# Patient Record
Sex: MALE | Race: BLACK OR AFRICAN AMERICAN | URBAN - METROPOLITAN AREA
[De-identification: names, ages, dates, MRNs, and addresses within clinical notes are randomized per-mention and may not be internally consistent; named-entity substitution may affect disease eponyms.]

---

## 2018-01-23 NOTE — LETTER
WORK STATUS WORKSHEET    Date & Time: 1/24/2025, 11:32 AM  Patient: Jing Valenzuela    Diagnosis:  No diagnosis found.    Next Appointment Date/Time: *** at { DEPTS:2638}   { WORK RESTRICTIONS:3094}    Follow Up:  { FOLLOW UP:2637}    Prescription for:  {PRESCRIPTION FOR:0547776}    After Care Instructions:  {AFTER CARE INSTRUCTION:9522282}    Restrictions:  Lumbar Back Strain:  {LUMBAR BACK STRAIN RESTRICTIONS:7420171}  Upper Extremities:  {UPPER EXTREMITY RESTRICTIONS:2639}  Lower Extremities: {LOWER EXTREMITIES WORK RESTRICTIONS:6720715}  Wound:  {WOUND RESTRICTIONS:7865802}  Eye:  {EYE RESTRICTIONS:9963290}  Other-General:  {OTHER-GENERAL RESTRICTIONS:2473029}      Above instructions reviewed with Jing Valenzuela.  He verbalized understanding.      _____________________________  Physician/APC Signature       Principal Discharge DX:	Viral syndrome

## 2019-06-05 NOTE — LETTER
Date: 11/10/2023    Patient Name: Lai Grove        To Whom it may concern: This letter has been written at the patient's request. The above patient was seen at the [facility] for treatment of a medical condition. This patient should be excused from attending work/school from Thursday 11/9/2023 through Friday 11/10/23. The patient may return to work/school on next scheduled shift with no limitations.         Sincerely,    ROSIO Kaiser, PA-C  Physician Assistant  [facility] 0

## 2021-07-06 ENCOUNTER — OFFICE VISIT (OUTPATIENT)
Dept: FAMILY MEDICINE CLINIC | Facility: CLINIC | Age: 26
End: 2021-07-06
Payer: MEDICAID

## 2021-07-06 VITALS
BODY MASS INDEX: 38.36 KG/M2 | HEIGHT: 70.47 IN | HEART RATE: 76 BPM | SYSTOLIC BLOOD PRESSURE: 140 MMHG | RESPIRATION RATE: 16 BRPM | DIASTOLIC BLOOD PRESSURE: 80 MMHG | TEMPERATURE: 98 F | WEIGHT: 271 LBS | OXYGEN SATURATION: 98 %

## 2021-07-06 DIAGNOSIS — I10 ESSENTIAL HYPERTENSION: Primary | ICD-10-CM

## 2021-07-06 DIAGNOSIS — F98.8 ATTENTION DEFICIT DISORDER, UNSPECIFIED HYPERACTIVITY PRESENCE: ICD-10-CM

## 2021-07-06 DIAGNOSIS — M21.372 LEFT FOOT DROP: ICD-10-CM

## 2021-07-06 PROCEDURE — 3008F BODY MASS INDEX DOCD: CPT | Performed by: FAMILY MEDICINE

## 2021-07-06 PROCEDURE — 99204 OFFICE O/P NEW MOD 45 MIN: CPT | Performed by: FAMILY MEDICINE

## 2021-07-06 PROCEDURE — 3077F SYST BP >= 140 MM HG: CPT | Performed by: FAMILY MEDICINE

## 2021-07-06 PROCEDURE — 3079F DIAST BP 80-89 MM HG: CPT | Performed by: FAMILY MEDICINE

## 2021-07-06 RX ORDER — LOSARTAN POTASSIUM 25 MG/1
25 TABLET ORAL DAILY
Qty: 90 TABLET | Refills: 0 | Status: SHIPPED | OUTPATIENT
Start: 2021-07-06 | End: 2021-10-14

## 2021-07-06 RX ORDER — LISINOPRIL 20 MG/1
20 TABLET ORAL DAILY
COMMUNITY
Start: 2021-04-27 | End: 2021-07-06

## 2021-07-06 NOTE — PROGRESS NOTES
Maverick Calzada is a 23year old male. CC:  Patient presents with:  New Patient: Establish Care      HPI: NEW PATIENT  Here to follow up hypertension.  He did not tolerate Lisinopril 20 mg every day and has dropped to 5 mg qd  Home BP readings: none  Med click; murmur negative  PULM: clear to auscultation B, no accessory muscle use  GI: normal active BS+, soft, nondistended; no HSM; no masses; no bruits; no masses; nontender, no G/R/R   PSYCH: alert and oriented x 3; affect appropriate  SKIN: not examined

## 2021-08-17 ENCOUNTER — OFFICE VISIT (OUTPATIENT)
Dept: FAMILY MEDICINE CLINIC | Facility: CLINIC | Age: 26
End: 2021-08-17
Payer: MEDICAID

## 2021-08-17 VITALS
WEIGHT: 273 LBS | OXYGEN SATURATION: 98 % | DIASTOLIC BLOOD PRESSURE: 80 MMHG | HEART RATE: 56 BPM | BODY MASS INDEX: 39 KG/M2 | SYSTOLIC BLOOD PRESSURE: 126 MMHG | TEMPERATURE: 98 F

## 2021-08-17 DIAGNOSIS — I10 ESSENTIAL HYPERTENSION: Primary | ICD-10-CM

## 2021-08-17 DIAGNOSIS — F98.8 ATTENTION DEFICIT DISORDER, UNSPECIFIED HYPERACTIVITY PRESENCE: ICD-10-CM

## 2021-08-17 PROCEDURE — 3079F DIAST BP 80-89 MM HG: CPT | Performed by: FAMILY MEDICINE

## 2021-08-17 PROCEDURE — 3074F SYST BP LT 130 MM HG: CPT | Performed by: FAMILY MEDICINE

## 2021-08-17 PROCEDURE — 99214 OFFICE O/P EST MOD 30 MIN: CPT | Performed by: FAMILY MEDICINE

## 2021-08-17 RX ORDER — DEXTROAMPHETAMINE SACCHARATE, AMPHETAMINE ASPARTATE, DEXTROAMPHETAMINE SULFATE AND AMPHETAMINE SULFATE 2.5; 2.5; 2.5; 2.5 MG/1; MG/1; MG/1; MG/1
10 TABLET ORAL 2 TIMES DAILY
Qty: 60 TABLET | Refills: 0 | Status: SHIPPED | OUTPATIENT
Start: 2021-08-17

## 2021-08-17 NOTE — PROGRESS NOTES
Benitez Qureshi is a 23year old male. CC:  Patient presents with: Follow - Up: per pt      HPI:  Here to follow up hypertension. He admits to not taking Losartan daily as he forgets.   Home BP readings: none  Medication side effects: none  Symptoms: no Exam:  GEN: well developed, well nourished, in no apparent distress  EYE: B conjunctiva and lids normal  HENT: B pinnas, external auditory canals and tympanic membranes are normal.   NECK: No lymphadenopathy, thyromegaly or masses  CAR: S1, S2 normal, RRR;

## 2021-08-18 ENCOUNTER — TELEPHONE (OUTPATIENT)
Dept: FAMILY MEDICINE CLINIC | Facility: CLINIC | Age: 26
End: 2021-08-18

## 2021-08-18 NOTE — TELEPHONE ENCOUNTER
Patient advised that insurance did not approve the adderall that was prescription by . Patient advised to try and use [facility] for the prescription. Pharmacy advised of the denial via voice mail.

## 2021-09-15 ENCOUNTER — OFFICE VISIT (OUTPATIENT)
Dept: FAMILY MEDICINE CLINIC | Facility: CLINIC | Age: 26
End: 2021-09-15
Payer: MEDICAID

## 2021-09-15 VITALS
SYSTOLIC BLOOD PRESSURE: 120 MMHG | DIASTOLIC BLOOD PRESSURE: 84 MMHG | BODY MASS INDEX: 38 KG/M2 | OXYGEN SATURATION: 99 % | WEIGHT: 271 LBS | HEART RATE: 64 BPM | TEMPERATURE: 98 F

## 2021-09-15 DIAGNOSIS — F98.8 ATTENTION DEFICIT DISORDER, UNSPECIFIED HYPERACTIVITY PRESENCE: ICD-10-CM

## 2021-09-15 DIAGNOSIS — B07.9 VIRAL WARTS, UNSPECIFIED TYPE: ICD-10-CM

## 2021-09-15 DIAGNOSIS — I10 ESSENTIAL HYPERTENSION: Primary | ICD-10-CM

## 2021-09-15 PROCEDURE — 3074F SYST BP LT 130 MM HG: CPT | Performed by: FAMILY MEDICINE

## 2021-09-15 PROCEDURE — 99214 OFFICE O/P EST MOD 30 MIN: CPT | Performed by: FAMILY MEDICINE

## 2021-09-15 PROCEDURE — 3079F DIAST BP 80-89 MM HG: CPT | Performed by: FAMILY MEDICINE

## 2021-09-15 NOTE — PROGRESS NOTES
Cruz Acosta is a 23year old male. CC:  Patient presents with:  Medication Follow-Up: per pt      HPI:  F/u ADD.  He has yet to  the Adderall from the pharmacy    Here to follow up hypertension  Home BP readings: none  Medication side effects: NECK: No lymphadenopathy, thyromegaly or masses  CAR: S1, S2 normal, RRR; no S3, no S4; no click; murmur negative  PULM: clear to auscultation B, no accessory muscle use  GI: not examined  PSYCH: alert and oriented x 3; affect appropriate  SKIN: R forear

## 2021-10-14 NOTE — PROGRESS NOTES
Tosin Peña is a 23year old male. CC:  Patient presents with:  Medication Follow-Up: per pt  Warts: f/u      HPI:  The patient is here for follow up medication use for ADD/ADHD.  He has taken Adderall a few times as needed  Concentration: good  Orga and tympanic membranes are normal.   NECK: No lymphadenopathy, thyromegaly or masses  CAR: S1, S2 normal, RRR; no S3, no S4; no click; murmur negative  PULM: clear to auscultation B, no accessory muscle use  GI: not examined  PSYCH: alert and oriented x 3;

## 2021-11-12 ENCOUNTER — OFFICE VISIT (OUTPATIENT)
Dept: FAMILY MEDICINE CLINIC | Facility: CLINIC | Age: 26
End: 2021-11-12
Payer: MEDICAID

## 2021-11-12 VITALS
BODY MASS INDEX: 39 KG/M2 | HEART RATE: 63 BPM | WEIGHT: 275 LBS | SYSTOLIC BLOOD PRESSURE: 136 MMHG | TEMPERATURE: 97 F | OXYGEN SATURATION: 98 % | DIASTOLIC BLOOD PRESSURE: 89 MMHG

## 2021-11-12 DIAGNOSIS — F41.8 ANXIETY ABOUT DYING: ICD-10-CM

## 2021-11-12 DIAGNOSIS — B07.9 VIRAL WARTS, UNSPECIFIED TYPE: ICD-10-CM

## 2021-11-12 DIAGNOSIS — I10 ESSENTIAL HYPERTENSION: Primary | ICD-10-CM

## 2021-11-12 PROCEDURE — 3075F SYST BP GE 130 - 139MM HG: CPT | Performed by: FAMILY MEDICINE

## 2021-11-12 PROCEDURE — 3079F DIAST BP 80-89 MM HG: CPT | Performed by: FAMILY MEDICINE

## 2021-11-12 PROCEDURE — 99214 OFFICE O/P EST MOD 30 MIN: CPT | Performed by: FAMILY MEDICINE

## 2021-11-12 NOTE — PROGRESS NOTES
Benitez Qureshi is a 23year old male.     CC:  Patient presents with:  Medication Follow-Up: per pt- stopped bp meds due to stomach pains      HPI:  Here to follow up hypertension--stopped Losartan 2 days after starting it because of abdominal pain  Home B Reviewed by Maite Sorto M.D.     Physical Exam:  GEN: well developed, well nourished, in no apparent distress  EYE: B conjunctiva and lids normal  HENT: -  NECK: No lymphadenopathy, thyromegaly or masses  CAR: S1, S2 normal, RRR; no S3, no S4; no click; mur

## 2022-04-05 ENCOUNTER — TELEPHONE (OUTPATIENT)
Dept: FAMILY MEDICINE CLINIC | Facility: CLINIC | Age: 27
End: 2022-04-05

## 2022-04-06 ENCOUNTER — TELEPHONE (OUTPATIENT)
Dept: FAMILY MEDICINE CLINIC | Facility: CLINIC | Age: 27
End: 2022-04-06

## 2022-04-06 NOTE — TELEPHONE ENCOUNTER
Patient advised that insurance has approved the Amphetamine- Dextroamphetamine. Approved from 1 5 2022 thru 4 5 2023. Case # [id number]  Requested quantity :up to 60 tablets per 30 days. Phone call to pharmacy and advised of this information - left on voice mail.

## 2022-04-19 ENCOUNTER — TELEPHONE (OUTPATIENT)
Dept: FAMILY MEDICINE CLINIC | Facility: CLINIC | Age: 27
End: 2022-04-19

## 2022-04-19 RX ORDER — DEXTROAMPHETAMINE SACCHARATE, AMPHETAMINE ASPARTATE, DEXTROAMPHETAMINE SULFATE AND AMPHETAMINE SULFATE 2.5; 2.5; 2.5; 2.5 MG/1; MG/1; MG/1; MG/1
10 TABLET ORAL 2 TIMES DAILY
Qty: 60 TABLET | Refills: 0 | Status: SHIPPED | OUTPATIENT
Start: 2022-04-19 | End: 2022-05-19

## 2022-04-19 NOTE — TELEPHONE ENCOUNTER
Patient advised that the Ampheta/Dextro Combo is on back order. Patient states that he was advised of this by [facility] and they advised patient to check another store. Patient asked for the prescription to be sent to [facility].

## 2022-04-19 NOTE — TELEPHONE ENCOUNTER
Please let patient or caregiver know or leave message that: One month worth of Adderall sent to [facility].

## 2022-05-03 ENCOUNTER — TELEPHONE (OUTPATIENT)
Dept: FAMILY MEDICINE CLINIC | Facility: CLINIC | Age: 27
End: 2022-05-03

## 2023-04-13 ENCOUNTER — OFFICE VISIT (OUTPATIENT)
Dept: FAMILY MEDICINE CLINIC | Facility: CLINIC | Age: 28
End: 2023-04-13
Payer: MEDICAID

## 2023-04-13 VITALS
DIASTOLIC BLOOD PRESSURE: 100 MMHG | WEIGHT: 296 LBS | OXYGEN SATURATION: 96 % | HEART RATE: 73 BPM | TEMPERATURE: 97 F | RESPIRATION RATE: 16 BRPM | SYSTOLIC BLOOD PRESSURE: 140 MMHG | BODY MASS INDEX: 42 KG/M2

## 2023-04-13 DIAGNOSIS — Z00.00 WELL ADULT EXAM: Primary | ICD-10-CM

## 2023-04-13 DIAGNOSIS — R22.2 LUMP IN CHEST: ICD-10-CM

## 2023-04-13 DIAGNOSIS — I10 ESSENTIAL HYPERTENSION: ICD-10-CM

## 2023-04-13 LAB
ALT SERPL-CCNC: 57 U/L
ANION GAP SERPL CALC-SCNC: 4 MMOL/L (ref 0–18)
AST SERPL-CCNC: 26 U/L (ref 15–37)
BUN BLD-MCNC: 9 MG/DL (ref 7–18)
CALCIUM BLD-MCNC: 9.1 MG/DL (ref 8.5–10.1)
CHLORIDE SERPL-SCNC: 110 MMOL/L (ref 98–112)
CHOLEST SERPL-MCNC: 160 MG/DL (ref ?–200)
CO2 SERPL-SCNC: 24 MMOL/L (ref 21–32)
CREAT BLD-MCNC: 0.82 MG/DL
ERYTHROCYTE [DISTWIDTH] IN BLOOD BY AUTOMATED COUNT: 12.5 %
FASTING PATIENT LIPID ANSWER: YES
FASTING STATUS PATIENT QL REPORTED: YES
GFR SERPLBLD BASED ON 1.73 SQ M-ARVRAT: 123 ML/MIN/1.73M2 (ref 60–?)
GLUCOSE BLD-MCNC: 96 MG/DL (ref 70–99)
HCT VFR BLD AUTO: 44.5 %
HDLC SERPL-MCNC: 34 MG/DL (ref 40–59)
HGB BLD-MCNC: 14.8 G/DL
LDLC SERPL CALC-MCNC: 107 MG/DL (ref ?–100)
MCH RBC QN AUTO: 28.7 PG (ref 26–34)
MCHC RBC AUTO-ENTMCNC: 33.3 G/DL (ref 31–37)
MCV RBC AUTO: 86.4 FL
NONHDLC SERPL-MCNC: 126 MG/DL (ref ?–130)
OSMOLALITY SERPL CALC.SUM OF ELEC: 285 MOSM/KG (ref 275–295)
PLATELET # BLD AUTO: 208 10(3)UL (ref 150–450)
POTASSIUM SERPL-SCNC: 3.9 MMOL/L (ref 3.5–5.1)
RBC # BLD AUTO: 5.15 X10(6)UL
SODIUM SERPL-SCNC: 138 MMOL/L (ref 136–145)
T4 FREE SERPL-MCNC: 1.1 NG/DL (ref 0.8–1.7)
TRIGL SERPL-MCNC: 100 MG/DL (ref 30–149)
TSI SER-ACNC: 1.14 MIU/ML (ref 0.36–3.74)
VLDLC SERPL CALC-MCNC: 17 MG/DL (ref 0–30)
WBC # BLD AUTO: 5.6 X10(3) UL (ref 4–11)

## 2023-04-13 PROCEDURE — 84450 TRANSFERASE (AST) (SGOT): CPT | Performed by: FAMILY MEDICINE

## 2023-04-13 PROCEDURE — 3080F DIAST BP >= 90 MM HG: CPT | Performed by: FAMILY MEDICINE

## 2023-04-13 PROCEDURE — 84443 ASSAY THYROID STIM HORMONE: CPT | Performed by: FAMILY MEDICINE

## 2023-04-13 PROCEDURE — 3077F SYST BP >= 140 MM HG: CPT | Performed by: FAMILY MEDICINE

## 2023-04-13 PROCEDURE — 80061 LIPID PANEL: CPT | Performed by: FAMILY MEDICINE

## 2023-04-13 PROCEDURE — 84460 ALANINE AMINO (ALT) (SGPT): CPT | Performed by: FAMILY MEDICINE

## 2023-04-13 PROCEDURE — 99213 OFFICE O/P EST LOW 20 MIN: CPT | Performed by: FAMILY MEDICINE

## 2023-04-13 PROCEDURE — 99395 PREV VISIT EST AGE 18-39: CPT | Performed by: FAMILY MEDICINE

## 2023-04-13 PROCEDURE — 85027 COMPLETE CBC AUTOMATED: CPT | Performed by: FAMILY MEDICINE

## 2023-04-13 PROCEDURE — 84439 ASSAY OF FREE THYROXINE: CPT | Performed by: FAMILY MEDICINE

## 2023-04-13 PROCEDURE — 80048 BASIC METABOLIC PNL TOTAL CA: CPT | Performed by: FAMILY MEDICINE

## 2023-04-13 RX ORDER — OLMESARTAN MEDOXOMIL 20 MG/1
20 TABLET ORAL DAILY
Qty: 90 TABLET | Refills: 0 | Status: SHIPPED | OUTPATIENT
Start: 2023-04-13

## 2023-04-14 ENCOUNTER — TELEPHONE (OUTPATIENT)
Dept: FAMILY MEDICINE CLINIC | Facility: CLINIC | Age: 28
End: 2023-04-14

## 2023-04-14 NOTE — TELEPHONE ENCOUNTER
Restarted the PA for the olmesartan via [facility]- added additional information that indicates that the pt did not tolerate the lisinopril or the losartan will watch for decision    If negative decision the cost of the olmesartan with [facility]  $3.59 @ [employer] [place]

## 2023-04-18 ENCOUNTER — TELEPHONE (OUTPATIENT)
Dept: FAMILY MEDICINE CLINIC | Facility: CLINIC | Age: 28
End: 2023-04-18

## 2023-04-18 NOTE — TELEPHONE ENCOUNTER
Received a paperwork  form [employer] they are not covering Olmesartan.  [id number]    Note per Dr Dipika Vidales: would like to try Valsartan     Multi medications offered as options:  Benazepril, captopril, enalapril, fosinopril, moexipril, quinapril, ramipril, trandolapril, irbesartan, valsartan     Left message for patient to call back

## 2023-04-19 RX ORDER — VALSARTAN 80 MG/1
80 TABLET ORAL DAILY
Qty: 90 TABLET | Refills: 0 | Status: SHIPPED | OUTPATIENT
Start: 2023-04-19

## 2023-04-19 NOTE — TELEPHONE ENCOUNTER
Patient's name and  verified     Patient is amendable to starting the Valsartan. Verified pharmacy: [address] [place] [address]    Patient aware to check with pharmacy later today for prescription.      Patient notified and verbalized an understanding

## 2023-07-21 ENCOUNTER — OFFICE VISIT (OUTPATIENT)
Dept: FAMILY MEDICINE CLINIC | Facility: CLINIC | Age: 28
End: 2023-07-21
Payer: MEDICAID

## 2023-07-21 VITALS
BODY MASS INDEX: 42.29 KG/M2 | HEIGHT: 70 IN | SYSTOLIC BLOOD PRESSURE: 164 MMHG | HEART RATE: 89 BPM | OXYGEN SATURATION: 96 % | TEMPERATURE: 97 F | DIASTOLIC BLOOD PRESSURE: 98 MMHG | WEIGHT: 295.38 LBS

## 2023-07-21 DIAGNOSIS — I10 ESSENTIAL HYPERTENSION: ICD-10-CM

## 2023-07-21 DIAGNOSIS — M21.372 FOOT DROP, LEFT: Primary | ICD-10-CM

## 2023-07-21 PROCEDURE — 3080F DIAST BP >= 90 MM HG: CPT | Performed by: FAMILY MEDICINE

## 2023-07-21 PROCEDURE — 99214 OFFICE O/P EST MOD 30 MIN: CPT | Performed by: FAMILY MEDICINE

## 2023-07-21 PROCEDURE — 3008F BODY MASS INDEX DOCD: CPT | Performed by: FAMILY MEDICINE

## 2023-07-21 PROCEDURE — 3077F SYST BP >= 140 MM HG: CPT | Performed by: FAMILY MEDICINE

## 2023-07-21 RX ORDER — VALSARTAN 80 MG/1
80 TABLET ORAL DAILY
Qty: 90 TABLET | Refills: 1 | Status: SHIPPED | OUTPATIENT
Start: 2023-07-21

## 2023-11-10 ENCOUNTER — OFFICE VISIT (OUTPATIENT)
Dept: FAMILY MEDICINE CLINIC | Facility: CLINIC | Age: 28
End: 2023-11-10
Payer: MEDICAID

## 2023-11-10 VITALS
SYSTOLIC BLOOD PRESSURE: 156 MMHG | RESPIRATION RATE: 18 BRPM | HEIGHT: 70 IN | WEIGHT: 287 LBS | OXYGEN SATURATION: 97 % | DIASTOLIC BLOOD PRESSURE: 102 MMHG | HEART RATE: 83 BPM | BODY MASS INDEX: 41.09 KG/M2 | TEMPERATURE: 98 F

## 2023-11-10 DIAGNOSIS — R03.0 ELEVATED BLOOD PRESSURE READING: ICD-10-CM

## 2023-11-10 DIAGNOSIS — J06.9 URI WITH COUGH AND CONGESTION: Primary | ICD-10-CM

## 2023-11-10 PROCEDURE — 3077F SYST BP >= 140 MM HG: CPT | Performed by: PHYSICIAN ASSISTANT

## 2023-11-10 PROCEDURE — 3008F BODY MASS INDEX DOCD: CPT | Performed by: PHYSICIAN ASSISTANT

## 2023-11-10 PROCEDURE — 99213 OFFICE O/P EST LOW 20 MIN: CPT | Performed by: PHYSICIAN ASSISTANT

## 2023-11-10 PROCEDURE — 3080F DIAST BP >= 90 MM HG: CPT | Performed by: PHYSICIAN ASSISTANT

## 2023-11-10 PROCEDURE — 87637 SARSCOV2&INF A&B&RSV AMP PRB: CPT | Performed by: PHYSICIAN ASSISTANT

## 2023-11-11 LAB
FLUAV + FLUBV RNA SPEC NAA+PROBE: NOT DETECTED
FLUAV + FLUBV RNA SPEC NAA+PROBE: NOT DETECTED
RSV RNA SPEC NAA+PROBE: DETECTED
SARS-COV-2 RNA RESP QL NAA+PROBE: DETECTED

## 2024-08-22 ENCOUNTER — TELEPHONE (OUTPATIENT)
Dept: CASE MANAGEMENT | Age: 29
End: 2024-08-22

## 2024-10-30 NOTE — TELEPHONE ENCOUNTER
Called pt to schedule a HTN follow-up appt :      Dr. Tod Larson  [address] [place] [address] [place] [address]  [phone]      Left patient message to call office to schedule Hypertension follow-up appointment, second attempt.

## 2025-01-23 ENCOUNTER — TELEPHONE (OUTPATIENT)
Dept: FAMILY MEDICINE CLINIC | Facility: CLINIC | Age: 30
End: 2025-01-23

## 2025-01-23 ENCOUNTER — HOSPITAL ENCOUNTER (OUTPATIENT)
Dept: GENERAL RADIOLOGY | Age: 30
Discharge: HOME | End: 2025-01-23
Attending: FAMILY MEDICINE
Payer: COMMERCIAL

## 2025-01-23 ENCOUNTER — OFFICE VISIT (OUTPATIENT)
Dept: FAMILY MEDICINE CLINIC | Facility: CLINIC | Age: 30
End: 2025-01-23
Payer: COMMERCIAL

## 2025-01-23 VITALS
SYSTOLIC BLOOD PRESSURE: 138 MMHG | HEART RATE: 75 BPM | TEMPERATURE: 98 F | OXYGEN SATURATION: 100 % | DIASTOLIC BLOOD PRESSURE: 88 MMHG | WEIGHT: 276 LBS | BODY MASS INDEX: 40 KG/M2

## 2025-01-23 DIAGNOSIS — M54.42 LEFT-SIDED LOW BACK PAIN WITH LEFT-SIDED SCIATICA, UNSPECIFIED CHRONICITY: Primary | ICD-10-CM

## 2025-01-23 DIAGNOSIS — M54.42 LEFT-SIDED LOW BACK PAIN WITH LEFT-SIDED SCIATICA, UNSPECIFIED CHRONICITY: ICD-10-CM

## 2025-01-23 PROCEDURE — 72110 X-RAY EXAM L-2 SPINE 4/>VWS: CPT | Performed by: FAMILY MEDICINE

## 2025-01-23 PROCEDURE — 99214 OFFICE O/P EST MOD 30 MIN: CPT | Performed by: FAMILY MEDICINE

## 2025-01-23 NOTE — TELEPHONE ENCOUNTER
Does his employer have a formal form that will be used? I am only going to do this once. The other thing he needs to consider is that if his body can not handle the physical demands  of his job then he needs to find other employment. I will not continue to write notes of limitation of work for him if he can not handle the work load imposed on his body.     Thanks

## 2025-01-23 NOTE — TELEPHONE ENCOUNTER
Patient requesting note for work that includes patient limitations and dates. Please send through MyChart for patient, states he needs today per his employer. Endorsed to RN.

## 2025-01-23 NOTE — PROGRESS NOTES
Jing Valenzuela is a 29 year old male.    CC:    Chief Complaint   Patient presents with    Back Pain       HPI:  Chief Complaint: Low Back Pain. He has a known L foot drop for many years   Duration:  1 Years(s)  Severity: moderate  Cause/ Description of Injury: unknown but he has a more strenuous job the past 1.5 years  Radiation of pain: L thigh  Aggravated by: twisting  Alleviated by: nothing provides relief, but has not really tried anything  Weakness:  No   Bowel/Bladder Dysfunction:  No   Previous Imaging: no     He never started Valsartan as directed for elevated BP. Has been trying to eat healthier and is smoking less marijuana.    Allergies as of 01/23/2025    (No Known Allergies)        Current Meds:  No current outpatient medications on file.        History:  Past Medical History:    ADD (attention deficit disorder)    From previous medical records    Foot drop, left    Due to L knee injury    Hypertension      Past Surgical History:   Procedure Laterality Date    Knee surgery      LCL, ACL, PCL, MCl repair and meniscectomy      Family History   Problem Relation Age of Onset    Diabetes Father       Family Status   Relation Status    Mo Alive    Fa Alive      Social History     Socioeconomic History    Marital status: Single   Tobacco Use    Smoking status: Never    Smokeless tobacco: Never   Vaping Use    Vaping status: Never Used   Substance and Sexual Activity    Alcohol use: Yes     Comment: social    Drug use: Not Currently     Social Drivers of Health      [facility]    Social Connections    Received from [facility], [facility]    Housing Stability        ROS:  General: energy level stable, no fevers      Vitals: /84   Pulse 75   Temp 97.7 °F (36.5 °C) (Temporal)   Wt 276 lb (125.2 kg)   SpO2 100%   BMI 39.60 kg/m²    Reviewed by GENARO Larson M.D.  /88   Pulse 75   Temp  97.7 °F (36.5 °C) (Temporal)   Wt 276 lb (125.2 kg)   SpO2 100%   BMI 39.60 kg/m²    Wt Readings from Last 3 Encounters:   01/23/25 276 lb (125.2 kg)   11/10/23 287 lb (130.2 kg)   07/21/23 295 lb 6.4 oz (134 kg)       Physical Exam:  GEN: well developed, well nourished, in no apparent distress  EYE: B conjunctiva and lids normal  HENT: ---  NECK: No lymphadenopathy, thyromegaly or masses  CAR: S1, S2 normal, RRR; no S3, no S4; no click; murmur negative  PULM: clear to auscultation B, no accessory muscle use  GI: not examined  PSYCH: alert and oriented x 3; affect appropriate  SKIN: no rashes, no suspicious lesions on lower back  EXTREMITIES: No clubbing, cyanosis or edema  GENITAL: not examined  LYMPH: no supraclavicular nodes  NEURO: Awake and alert. Normal speech and articulation. No facial droop or asymmetry. Moving all extremities equally. Symmetric B patellar DTRs and Achilles DTRs. Neg B SLR  MS: no lumbar spine tenderness, + L lower para lumbar tenderness, + L gluteus medius tenderness    ASSESSMENT AND PLAN    1. Left-sided low back pain with left-sided sciatica, unspecified chronicity  Await results, if fine then will start PT  - XR LUMBAR SPINE (MIN 4 VIEWS) (CPT=72110); Future      No follow-ups on file.    Orders for this visit:    No orders of the defined types were placed in this encounter.      None    Meds & Refills for this Visit:  Requested Prescriptions      No prescriptions requested or ordered in this encounter             Authorized by Tod Larson M.D.

## 2025-01-24 NOTE — TELEPHONE ENCOUNTER
Patient's name and  verified     Patient needs in a letter, stating that patient needs to attend physical therapy. Patient also needs restrictions on lifting and bending and twisting.     Patient's work doesn't have an formal paperwork just needs a letter.     Patient has his physical therapy all set up.     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 Patient is applying for other positions.    Please Advise

## 2025-01-24 NOTE — TELEPHONE ENCOUNTER
Please let patient or caregiver know or leave message that:  Work note placed in MyChart  Thanks

## 2025-01-27 ENCOUNTER — TELEPHONE (OUTPATIENT)
Dept: PHYSICAL THERAPY | Facility: HOSPITAL | Age: 30
End: 2025-01-27

## 2025-01-27 ENCOUNTER — APPOINTMENT (OUTPATIENT)
Dept: PHYSICAL THERAPY | Age: 30
End: 2025-01-27
Attending: FAMILY MEDICINE
Payer: OTHER MISCELLANEOUS

## 2025-01-27 ENCOUNTER — TELEPHONE (OUTPATIENT)
Dept: PHYSICAL THERAPY | Age: 30
End: 2025-01-27

## 2025-01-30 ENCOUNTER — OFFICE VISIT (OUTPATIENT)
Dept: PHYSICAL THERAPY | Age: 30
End: 2025-01-30
Attending: FAMILY MEDICINE
Payer: OTHER MISCELLANEOUS

## 2025-01-30 ENCOUNTER — TELEPHONE (OUTPATIENT)
Dept: FAMILY MEDICINE CLINIC | Facility: CLINIC | Age: 30
End: 2025-01-30

## 2025-01-30 DIAGNOSIS — M54.42 LEFT-SIDED LOW BACK PAIN WITH LEFT-SIDED SCIATICA, UNSPECIFIED CHRONICITY: Primary | ICD-10-CM

## 2025-01-30 PROCEDURE — 97110 THERAPEUTIC EXERCISES: CPT

## 2025-01-30 PROCEDURE — 97161 PT EVAL LOW COMPLEX 20 MIN: CPT

## 2025-01-30 NOTE — PROGRESS NOTES
SPINE EVALUATION:     Diagnosis:   Left-sided low back pain with left-sided sciatica, unspecified chronicity (M54.42) Patient:  Jing Valenzuela (29 year old, male)        Referring Provider: Tod Larson  Today's Date   1/30/2025    Precautions:  None   Date of Evaluation: 01/30/25  Next MD visit: N/A  Date of Surgery: N/A     PATIENT SUMMARY   Summary of chief complaints: L sided LBP  History of current condition: Unable to roll over or bend forward without any intense pain. 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  Did go to occupational health and have an assessment.  Previously a football, basketball, track/field, wrestler.  Trying to eat healthier and smoke less marijuana. Overall improving since sx onset.   Pain level: current 5 /10, at best 4 /10, at worst 8 /10  Description of symptoms: L sided LBP, sharp with bending.  Feels like its starting in the L glute up to the lumbar region   Occupation: [employer], full time   Leisure activities/Hobbies: N/A - working third shift   Prior level of function: MOD I - chronic L foot drop  Current limitations: Bending, lifting, carrying objects  Pt goals: \"get back to 100%\"  Past medical history was reviewed with Jing.  Significant findings include: LCL, ACL, PCL, MCl repair and meniscectomy; chornic L foot drop  Imaging/Tests: Per medical chart: Mild straightening of the lumbar lordosis.  No significant lateral curvature.  No significant listhesis.  The vertebral body heights are maintained.  Intervertebral disc heights demonstrate mild height loss at L4-L5 and L5-S1, otherwise maintained.   Minimal endplate degenerative changes with osteophyte formation.   Jing  has a past medical history of ADD (attention deficit disorder), Foot drop, left, and Hypertension.  He  has a past surgical history that includes knee surgery.    ASSESSMENT  Jing presents to physical therapy evaluation with primary  c/o L sided LBP. The results of the objective tests and measures show Painful ROM in trunk, painful LE strength testing, poor core stabilization. Functional deficits include but are not limited to Bending, lifting, carrying objects. Signs and symptoms are consistent with diagnosis of Left-sided low back pain with left-sided sciatica, unspecified chronicity (M54.42). Pt and PT discussed evaluation findings, pathology, POC and HEP.  Pt voiced understanding and performs HEP correctly without reported pain. Skilled Physical Therapy is medically necessary to address the above impairments and reach functional goals.    OBJECTIVE:   Musculoskeletal:  Observation/Posture: forward head, rounded shoulders, PPT in seated   Accessory Motion: limited lumbar PA mobility at lower lumbar L3-S1, pain with resistance   Palpation: loss of thoracic kyphosis and lumbar lordosis, hypermobility into thoracic PA mobilization     YAAKOV ROM WNL and Strength (5/5) except below:  (* denotes performed with pain)  Trunk ROM     Flex 95% - pain returning to neutral standing --> no change with repeated movement testing     Ext 100% - stiffness on the L, slight L lean/rotation with ext --> worsen with repeated movement testing in standing    R L     Side bend 100% 100% - pinching at end range     Rotation 100% --> feels relief of symptoms 100% --> feels relief of symptoms   ,   Hip   MMT (-/5)    R L     Flex (L2) 4 4-     Ext  3+ 2+     Abd 3+ 2+     ER 4 3+     IR 3+ 2-     ,   Knee   MMT (-/5)    R L     Flex 5 4+     Ext (L3) 5 4+     Balance and Functional Mobility:  Gait: pt ambulates on level ground with normal mechanics.  Balance: SLS: EO R 5 sec, EO L 5 sec     Today's Treatment and Response:   Pt education was provided on exam findings, treatment diagnosis, treatment plan, expectations, and prognosis.  Today's Treatment       1/30/2025   Treatment   Therapeutic Exercise See HEP   Therapeutic Exercise Min 10   Total of Timed Procedures 10    Total of Service Based 40   Total Treatment Time 50         Patient was instructed in and issued a HEP for: Access Code: [email or web address]  URL: [email or web address]  Date: 01/30/2025  Prepared by: Tiff Mares    Exercises  - Supine Double Knee to Chest  - 1 x daily - 7 x weekly - 3 sets - 10 reps  - Neutral Curl Up with Straight Leg  - 1 x daily - 7 x weekly - 3 sets - 10 reps  - Clamshell  - 1 x daily - 7 x weekly - 2 sets - 20 reps  - Sidelying Reverse Clamshell  - 1 x daily - 7 x weekly - 2 sets - 20 reps  - Sidelying Hip Abduction  - 1 x daily - 7 x weekly - 3 sets - 10 reps    Charges:  PT EVAL: Low Complexity, 1 TE, 1 low  In agreement with evaluation findings and clinical rationale, this evaluation involved LOW COMPLEXITY decision making due to no personal factors/comorbidities, 1-2 body structures involved/activity limitations, and stable symptoms as documented in the evaluation.                                                                         PLAN OF CARE:    Goals: (to be met in 12 visits)   Goals       Therapy Goals      Not Met Progress Toward Partially Met Met   Pt will improve transversus abdominis recruitment to perform proper isometric contraction without requiring verbal or tactile cuing to promote advancement of therex. [] [] [] []   Pt will demonstrate good understanding of proper posture and body mechanics to decrease pain and improve spinal safety. [] [] [] []   Pt will report improved symptom centralization and absence of radicular symptoms for 3 consecutive days to improve function with ADLs. [] [] [] []   Pt will have decreased paraspinal mm tension to tolerate bending/lifting 50# for work and home activities. [] [] [] []   Pt will demonstrate improved core strength to be able to perform push/pull 50# with <3/10 pain. [] [] [] []   Pt will be independent and compliant with comprehensive HEP to maintain progress achieved in PT [] [] [] []                    Frequency /  Duration: Patient will be seen 2x/week or a total of 12  visits over a 90 day period. Treatment will include: Manual Therapy; Therapeutic Activities; Neuromuscular Re-education; Therapeutic Exercise; Home Exercise Program instruction    Education or treatment limitation: None   Rehab Potential: good     Oswestry Disability Index Score  Score: (Patient-Rptd) 12 % (1/30/2025  8:41 AM)    Patient/Family/Caregiver was advised of these findings, precautions, and treatment options and has agreed to actively participate in planning and for this course of care.    Thank you for your referral. Please co-sign or sign and return this letter via fax as soon as possible to [phone]. If you have any questions, please contact me at Dept: [phone]    Sincerely,  Electronically signed by therapist: Tiff Mares, PT, DPT, PCES  Physician's certification required: Yes  I certify the need for these services furnished under this plan of treatment and while under my care.    X___________________________________________________ Date____________________    Certification From: 1/30/2025  To:4/30/2025

## 2025-01-30 NOTE — PATIENT INSTRUCTIONS
Access Code: [email or web address]  URL: [email or web address]  Date: 01/30/2025  Prepared by: Tiff Mares    Exercises  - Supine Double Knee to Chest  - 1 x daily - 7 x weekly - 3 sets - 10 reps  - Neutral Curl Up with Straight Leg  - 1 x daily - 7 x weekly - 3 sets - 10 reps  - Clamshell  - 1 x daily - 7 x weekly - 2 sets - 20 reps  - Sidelying Reverse Clamshell  - 1 x daily - 7 x weekly - 2 sets - 20 reps  - Sidelying Hip Abduction  - 1 x daily - 7 x weekly - 3 sets - 10 reps    Consider podiatry - orthotics or arch supports

## 2025-02-03 ENCOUNTER — APPOINTMENT (OUTPATIENT)
Dept: PHYSICAL THERAPY | Age: 30
End: 2025-02-03
Attending: FAMILY MEDICINE
Payer: COMMERCIAL

## 2025-02-03 ENCOUNTER — TELEPHONE (OUTPATIENT)
Dept: PHYSICAL THERAPY | Age: 30
End: 2025-02-03

## 2025-02-06 ENCOUNTER — APPOINTMENT (OUTPATIENT)
Dept: PHYSICAL THERAPY | Age: 30
End: 2025-02-06
Attending: FAMILY MEDICINE
Payer: COMMERCIAL

## 2025-02-12 ENCOUNTER — APPOINTMENT (OUTPATIENT)
Dept: PHYSICAL THERAPY | Age: 30
End: 2025-02-12
Attending: FAMILY MEDICINE
Payer: COMMERCIAL

## 2025-02-12 ENCOUNTER — MED REC SCAN ONLY (OUTPATIENT)
Dept: FAMILY MEDICINE CLINIC | Facility: CLINIC | Age: 30
End: 2025-02-12

## 2025-02-12 ENCOUNTER — TELEPHONE (OUTPATIENT)
Dept: FAMILY MEDICINE CLINIC | Facility: CLINIC | Age: 30
End: 2025-02-12

## 2025-02-12 NOTE — TELEPHONE ENCOUNTER
Received incoming medical record request from [facility] for DOS 01/18/2025-Present. Sent via interoffice mail to [employer], copy sent to scanning.

## 2025-02-19 ENCOUNTER — APPOINTMENT (OUTPATIENT)
Dept: PHYSICAL THERAPY | Age: 30
End: 2025-02-19
Attending: FAMILY MEDICINE
Payer: COMMERCIAL

## 2025-02-21 ENCOUNTER — TELEPHONE (OUTPATIENT)
Dept: FAMILY MEDICINE CLINIC | Facility: CLINIC | Age: 30
End: 2025-02-21

## 2025-02-21 ENCOUNTER — MED REC SCAN ONLY (OUTPATIENT)
Dept: FAMILY MEDICINE CLINIC | Facility: CLINIC | Age: 30
End: 2025-02-21

## 2025-02-21 ENCOUNTER — APPOINTMENT (OUTPATIENT)
Dept: PHYSICAL THERAPY | Age: 30
End: 2025-02-21
Attending: FAMILY MEDICINE
Payer: COMMERCIAL

## 2025-02-21 NOTE — TELEPHONE ENCOUNTER
[employer] REQUESTING MEDICAL RECORDS   RE:EMPLOYER: MARS HERMES  CLAIM NUMBER: [id number]  DATE OF INJURY: 01-    [place]  [address] [place] [address]  [phone]  FAX# [phone]    SENT TO [facility] VIA

## 2025-02-24 ENCOUNTER — APPOINTMENT (OUTPATIENT)
Dept: PHYSICAL THERAPY | Age: 30
End: 2025-02-24
Attending: FAMILY MEDICINE
Payer: COMMERCIAL

## 2025-02-28 ENCOUNTER — APPOINTMENT (OUTPATIENT)
Dept: PHYSICAL THERAPY | Age: 30
End: 2025-02-28
Attending: FAMILY MEDICINE
Payer: COMMERCIAL

## 2025-03-04 ENCOUNTER — TELEPHONE (OUTPATIENT)
Dept: FAMILY MEDICINE CLINIC | Facility: CLINIC | Age: 30
End: 2025-03-04

## 2025-03-04 ENCOUNTER — MED REC SCAN ONLY (OUTPATIENT)
Dept: FAMILY MEDICINE CLINIC | Facility: CLINIC | Age: 30
End: 2025-03-04

## 2025-03-04 ENCOUNTER — TELEPHONE (OUTPATIENT)
Dept: PHYSICAL THERAPY | Facility: HOSPITAL | Age: 30
End: 2025-03-04

## 2025-03-04 NOTE — TELEPHONE ENCOUNTER
RECEIVED MEDICAL RECORD REQUEST FROM [facility] [employer] [facility] -     REQUESTING ANY/ALL MEDICAL RECORDS W/IN THE LAST 5 YEARS.    SENT TO [employer]

## 2025-03-06 ENCOUNTER — APPOINTMENT (OUTPATIENT)
Dept: PHYSICAL THERAPY | Age: 30
End: 2025-03-06
Attending: FAMILY MEDICINE
Payer: COMMERCIAL

## 2025-03-07 ENCOUNTER — OFFICE VISIT (OUTPATIENT)
Dept: FAMILY MEDICINE CLINIC | Facility: CLINIC | Age: 30
End: 2025-03-07
Payer: COMMERCIAL

## 2025-03-07 ENCOUNTER — TELEPHONE (OUTPATIENT)
Dept: FAMILY MEDICINE CLINIC | Facility: CLINIC | Age: 30
End: 2025-03-07

## 2025-03-07 VITALS
DIASTOLIC BLOOD PRESSURE: 80 MMHG | WEIGHT: 282 LBS | TEMPERATURE: 97 F | HEART RATE: 75 BPM | BODY MASS INDEX: 40 KG/M2 | SYSTOLIC BLOOD PRESSURE: 128 MMHG | OXYGEN SATURATION: 96 %

## 2025-03-07 DIAGNOSIS — M54.42 LEFT-SIDED LOW BACK PAIN WITH LEFT-SIDED SCIATICA, UNSPECIFIED CHRONICITY: Primary | ICD-10-CM

## 2025-03-07 PROCEDURE — 99213 OFFICE O/P EST LOW 20 MIN: CPT | Performed by: FAMILY MEDICINE

## 2025-03-07 NOTE — TELEPHONE ENCOUNTER
Please let patient or caregiver know or leave message that:  A note lifting the work restrictions has been placed into [employer].  Thanks

## 2025-03-07 NOTE — PROGRESS NOTES
Jing Valenzuela is a 29 year old male.    CC:    Chief Complaint   Patient presents with    Follow - Up       HPI:  F/u L lower back pain. He was only able to attend one PT session as it is very expensive. He was able to glean some nice exercises to help his low back and help strengthen the L hip/leg. He has been doing the exercises and stretches as well general exercising. His pain is much improved. He is happy with his progress.     Allergies as of 03/07/2025    (No Known Allergies)        Current Meds:  No current outpatient medications on file.        History:  Past Medical History:    ADD (attention deficit disorder)    From previous medical records    Foot drop, left    Due to L knee injury    Hypertension      Past Surgical History:   Procedure Laterality Date    Knee surgery      LCL, ACL, PCL, MCl repair and meniscectomy      Family History   Problem Relation Age of Onset    Diabetes Father       Family Status   Relation Status    Mo Alive    Fa Alive      Social History     Socioeconomic History    Marital status: Single   Tobacco Use    Smoking status: Never    Smokeless tobacco: Never   Vaping Use    Vaping status: Never Used   Substance and Sexual Activity    Alcohol use: Yes     Comment: social    Drug use: Yes     Types: Cannabis     Social Drivers of Health      [facility]    Housing Stability        ROS:  General: energy level stable      Vitals: /80   Pulse 75   Temp 97 °F (36.1 °C) (Temporal)   Wt 282 lb (127.9 kg)   SpO2 96%   BMI 40.46 kg/m²    Reviewed by GENARO Larson M.D.    Physical Exam:  GEN: well developed, well nourished, in no apparent distress  EYE: B conjunctiva and lids normal  HENT: ---  NECK: No lymphadenopathy, thyromegaly or masses  CAR: S1, S2 normal, RRR; no S3, no S4; no click; murmur negative  PULM: clear to auscultation B, no accessory muscle use  GI: not examined  PSYCH: alert and oriented x 3;  affect appropriate  SKIN: not examined  EXTREMITIES: No clubbing, cyanosis or edema  GENITAL: not examined  LYMPH: no supraclavicular nodes  NEURO: Awake and alert. Normal speech and articulation. No facial droop or asymmetry. Moving all extremities equally.  MS: Mild lower lumbar spine and L para spinal tenderness    ASSESSMENT AND PLAN    1. Left-sided low back pain with left-sided sciatica, unspecified chronicity  Improving  I encouraged him to continue with the lower back and leg exercises  I encouraged getting his weight down about 60 lbs to ease the strain on his back  F/u as needed      No follow-ups on file.    Orders for this visit:    No orders of the defined types were placed in this encounter.      None    Meds & Refills for this Visit:  Requested Prescriptions      No prescriptions requested or ordered in this encounter             Authorized by Tod Larson M.D.

## 2025-03-07 NOTE — TELEPHONE ENCOUNTER
Patient's name and  verified     Patient doesn't need any restrictions.    Patient is looking for paperwork to release restrictions, so he can go back to work.    Patient is going to call his case manger through work comp to see if they can provide the paperwork and if they need to see him.     He will call back after he talks to them.    Patient notified and verbalized an understanding

## 2025-03-07 NOTE — TELEPHONE ENCOUNTER
We did not discuss restrictions at the visit. Does he still need them? If so, what type of restrictions is he seeking?    Thanks

## 2025-03-07 NOTE — TELEPHONE ENCOUNTER
Patient requesting note for work with new restrictions. Would like it sent via [employer] once it is ready. Endorsed to RN.

## (undated) NOTE — LETTER
3/7/2025      RE: Jing Valenzuela  : 4/3/1995      To Whom it May Concern,      Jing Valenzuela may return to work without restrictions as of 3/7/2025.        Regards,             Tod Larson MD

## (undated) NOTE — LETTER
2025      RE: Jing Valenzuela  : 4/3/1995      To Whom it May Concern,      Jing Valenzuela was seen in office 2025 for issues pertaining to low back pain. He will be attending physical therapy as a treatment to resolve his issues. Starting 2025 he must not perform activities that require lifting more then 25 lbs. 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 These restrictions are in effect until 3/7/2025.        Regards,             Tod Larson MD